# Patient Record
Sex: FEMALE | URBAN - METROPOLITAN AREA
[De-identification: names, ages, dates, MRNs, and addresses within clinical notes are randomized per-mention and may not be internally consistent; named-entity substitution may affect disease eponyms.]

---

## 2024-01-07 ENCOUNTER — INPATIENT (INPATIENT)
Facility: HOSPITAL | Age: 79
LOS: 1 days | Discharge: HOME | DRG: 195 | End: 2024-01-09
Attending: STUDENT IN AN ORGANIZED HEALTH CARE EDUCATION/TRAINING PROGRAM | Admitting: STUDENT IN AN ORGANIZED HEALTH CARE EDUCATION/TRAINING PROGRAM
Payer: MEDICARE

## 2024-01-07 VITALS
DIASTOLIC BLOOD PRESSURE: 69 MMHG | TEMPERATURE: 98 F | RESPIRATION RATE: 17 BRPM | HEIGHT: 63 IN | WEIGHT: 134.04 LBS | HEART RATE: 89 BPM | SYSTOLIC BLOOD PRESSURE: 120 MMHG | OXYGEN SATURATION: 98 %

## 2024-01-07 DIAGNOSIS — J18.9 PNEUMONIA, UNSPECIFIED ORGANISM: ICD-10-CM

## 2024-01-07 LAB
ALBUMIN SERPL ELPH-MCNC: 3 G/DL — LOW (ref 3.5–5)
ALBUMIN SERPL ELPH-MCNC: 3 G/DL — LOW (ref 3.5–5)
ALP SERPL-CCNC: 69 U/L — SIGNIFICANT CHANGE UP (ref 40–120)
ALP SERPL-CCNC: 69 U/L — SIGNIFICANT CHANGE UP (ref 40–120)
ALT FLD-CCNC: 19 U/L DA — SIGNIFICANT CHANGE UP (ref 10–60)
ALT FLD-CCNC: 19 U/L DA — SIGNIFICANT CHANGE UP (ref 10–60)
ANION GAP SERPL CALC-SCNC: 5 MMOL/L — SIGNIFICANT CHANGE UP (ref 5–17)
ANION GAP SERPL CALC-SCNC: 5 MMOL/L — SIGNIFICANT CHANGE UP (ref 5–17)
APTT BLD: 35.6 SEC — SIGNIFICANT CHANGE UP (ref 24.5–35.6)
APTT BLD: 35.6 SEC — SIGNIFICANT CHANGE UP (ref 24.5–35.6)
AST SERPL-CCNC: 23 U/L — SIGNIFICANT CHANGE UP (ref 10–40)
AST SERPL-CCNC: 23 U/L — SIGNIFICANT CHANGE UP (ref 10–40)
BASOPHILS # BLD AUTO: 0.01 K/UL — SIGNIFICANT CHANGE UP (ref 0–0.2)
BASOPHILS # BLD AUTO: 0.01 K/UL — SIGNIFICANT CHANGE UP (ref 0–0.2)
BASOPHILS NFR BLD AUTO: 0.2 % — SIGNIFICANT CHANGE UP (ref 0–2)
BASOPHILS NFR BLD AUTO: 0.2 % — SIGNIFICANT CHANGE UP (ref 0–2)
BILIRUB SERPL-MCNC: 0.3 MG/DL — SIGNIFICANT CHANGE UP (ref 0.2–1.2)
BILIRUB SERPL-MCNC: 0.3 MG/DL — SIGNIFICANT CHANGE UP (ref 0.2–1.2)
BUN SERPL-MCNC: 15 MG/DL — SIGNIFICANT CHANGE UP (ref 7–18)
BUN SERPL-MCNC: 15 MG/DL — SIGNIFICANT CHANGE UP (ref 7–18)
CALCIUM SERPL-MCNC: 9.2 MG/DL — SIGNIFICANT CHANGE UP (ref 8.4–10.5)
CALCIUM SERPL-MCNC: 9.2 MG/DL — SIGNIFICANT CHANGE UP (ref 8.4–10.5)
CHLORIDE SERPL-SCNC: 105 MMOL/L — SIGNIFICANT CHANGE UP (ref 96–108)
CHLORIDE SERPL-SCNC: 105 MMOL/L — SIGNIFICANT CHANGE UP (ref 96–108)
CO2 SERPL-SCNC: 28 MMOL/L — SIGNIFICANT CHANGE UP (ref 22–31)
CO2 SERPL-SCNC: 28 MMOL/L — SIGNIFICANT CHANGE UP (ref 22–31)
CREAT SERPL-MCNC: 1.12 MG/DL — SIGNIFICANT CHANGE UP (ref 0.5–1.3)
CREAT SERPL-MCNC: 1.12 MG/DL — SIGNIFICANT CHANGE UP (ref 0.5–1.3)
EGFR: 51 ML/MIN/1.73M2 — LOW
EGFR: 51 ML/MIN/1.73M2 — LOW
EOSINOPHIL # BLD AUTO: 0.02 K/UL — SIGNIFICANT CHANGE UP (ref 0–0.5)
EOSINOPHIL # BLD AUTO: 0.02 K/UL — SIGNIFICANT CHANGE UP (ref 0–0.5)
EOSINOPHIL NFR BLD AUTO: 0.4 % — SIGNIFICANT CHANGE UP (ref 0–6)
EOSINOPHIL NFR BLD AUTO: 0.4 % — SIGNIFICANT CHANGE UP (ref 0–6)
FLUAV H1 2009 PAND RNA SPEC QL NAA+PROBE: DETECTED
FLUAV H1 2009 PAND RNA SPEC QL NAA+PROBE: DETECTED
GLUCOSE SERPL-MCNC: 96 MG/DL — SIGNIFICANT CHANGE UP (ref 70–99)
GLUCOSE SERPL-MCNC: 96 MG/DL — SIGNIFICANT CHANGE UP (ref 70–99)
HCT VFR BLD CALC: 31.6 % — LOW (ref 34.5–45)
HCT VFR BLD CALC: 31.6 % — LOW (ref 34.5–45)
HGB BLD-MCNC: 10.2 G/DL — LOW (ref 11.5–15.5)
HGB BLD-MCNC: 10.2 G/DL — LOW (ref 11.5–15.5)
IMM GRANULOCYTES NFR BLD AUTO: 0.4 % — SIGNIFICANT CHANGE UP (ref 0–0.9)
IMM GRANULOCYTES NFR BLD AUTO: 0.4 % — SIGNIFICANT CHANGE UP (ref 0–0.9)
INR BLD: 1.14 RATIO — SIGNIFICANT CHANGE UP (ref 0.85–1.18)
INR BLD: 1.14 RATIO — SIGNIFICANT CHANGE UP (ref 0.85–1.18)
LYMPHOCYTES # BLD AUTO: 1.3 K/UL — SIGNIFICANT CHANGE UP (ref 1–3.3)
LYMPHOCYTES # BLD AUTO: 1.3 K/UL — SIGNIFICANT CHANGE UP (ref 1–3.3)
LYMPHOCYTES # BLD AUTO: 24.1 % — SIGNIFICANT CHANGE UP (ref 13–44)
LYMPHOCYTES # BLD AUTO: 24.1 % — SIGNIFICANT CHANGE UP (ref 13–44)
MCHC RBC-ENTMCNC: 30.1 PG — SIGNIFICANT CHANGE UP (ref 27–34)
MCHC RBC-ENTMCNC: 30.1 PG — SIGNIFICANT CHANGE UP (ref 27–34)
MCHC RBC-ENTMCNC: 32.3 GM/DL — SIGNIFICANT CHANGE UP (ref 32–36)
MCHC RBC-ENTMCNC: 32.3 GM/DL — SIGNIFICANT CHANGE UP (ref 32–36)
MCV RBC AUTO: 93.2 FL — SIGNIFICANT CHANGE UP (ref 80–100)
MCV RBC AUTO: 93.2 FL — SIGNIFICANT CHANGE UP (ref 80–100)
MONOCYTES # BLD AUTO: 0.42 K/UL — SIGNIFICANT CHANGE UP (ref 0–0.9)
MONOCYTES # BLD AUTO: 0.42 K/UL — SIGNIFICANT CHANGE UP (ref 0–0.9)
MONOCYTES NFR BLD AUTO: 7.8 % — SIGNIFICANT CHANGE UP (ref 2–14)
MONOCYTES NFR BLD AUTO: 7.8 % — SIGNIFICANT CHANGE UP (ref 2–14)
NEUTROPHILS # BLD AUTO: 3.63 K/UL — SIGNIFICANT CHANGE UP (ref 1.8–7.4)
NEUTROPHILS # BLD AUTO: 3.63 K/UL — SIGNIFICANT CHANGE UP (ref 1.8–7.4)
NEUTROPHILS NFR BLD AUTO: 67.1 % — SIGNIFICANT CHANGE UP (ref 43–77)
NEUTROPHILS NFR BLD AUTO: 67.1 % — SIGNIFICANT CHANGE UP (ref 43–77)
NRBC # BLD: 0 /100 WBCS — SIGNIFICANT CHANGE UP (ref 0–0)
NRBC # BLD: 0 /100 WBCS — SIGNIFICANT CHANGE UP (ref 0–0)
PLATELET # BLD AUTO: 150 K/UL — SIGNIFICANT CHANGE UP (ref 150–400)
PLATELET # BLD AUTO: 150 K/UL — SIGNIFICANT CHANGE UP (ref 150–400)
POTASSIUM SERPL-MCNC: 3.9 MMOL/L — SIGNIFICANT CHANGE UP (ref 3.5–5.3)
POTASSIUM SERPL-MCNC: 3.9 MMOL/L — SIGNIFICANT CHANGE UP (ref 3.5–5.3)
POTASSIUM SERPL-SCNC: 3.9 MMOL/L — SIGNIFICANT CHANGE UP (ref 3.5–5.3)
POTASSIUM SERPL-SCNC: 3.9 MMOL/L — SIGNIFICANT CHANGE UP (ref 3.5–5.3)
PROT SERPL-MCNC: 7.7 G/DL — SIGNIFICANT CHANGE UP (ref 6–8.3)
PROT SERPL-MCNC: 7.7 G/DL — SIGNIFICANT CHANGE UP (ref 6–8.3)
PROTHROM AB SERPL-ACNC: 13 SEC — SIGNIFICANT CHANGE UP (ref 9.5–13)
PROTHROM AB SERPL-ACNC: 13 SEC — SIGNIFICANT CHANGE UP (ref 9.5–13)
RAPID RVP RESULT: DETECTED
RAPID RVP RESULT: DETECTED
RBC # BLD: 3.39 M/UL — LOW (ref 3.8–5.2)
RBC # BLD: 3.39 M/UL — LOW (ref 3.8–5.2)
RBC # FLD: 14.2 % — SIGNIFICANT CHANGE UP (ref 10.3–14.5)
RBC # FLD: 14.2 % — SIGNIFICANT CHANGE UP (ref 10.3–14.5)
SARS-COV-2 RNA SPEC QL NAA+PROBE: SIGNIFICANT CHANGE UP
SARS-COV-2 RNA SPEC QL NAA+PROBE: SIGNIFICANT CHANGE UP
SODIUM SERPL-SCNC: 138 MMOL/L — SIGNIFICANT CHANGE UP (ref 135–145)
SODIUM SERPL-SCNC: 138 MMOL/L — SIGNIFICANT CHANGE UP (ref 135–145)
TROPONIN I, HIGH SENSITIVITY RESULT: 4.1 NG/L — SIGNIFICANT CHANGE UP
TROPONIN I, HIGH SENSITIVITY RESULT: 4.1 NG/L — SIGNIFICANT CHANGE UP
WBC # BLD: 5.4 K/UL — SIGNIFICANT CHANGE UP (ref 3.8–10.5)
WBC # BLD: 5.4 K/UL — SIGNIFICANT CHANGE UP (ref 3.8–10.5)
WBC # FLD AUTO: 5.4 K/UL — SIGNIFICANT CHANGE UP (ref 3.8–10.5)
WBC # FLD AUTO: 5.4 K/UL — SIGNIFICANT CHANGE UP (ref 3.8–10.5)

## 2024-01-07 PROCEDURE — 71045 X-RAY EXAM CHEST 1 VIEW: CPT | Mod: 26

## 2024-01-07 PROCEDURE — 71250 CT THORAX DX C-: CPT | Mod: 26,MA

## 2024-01-07 PROCEDURE — 99285 EMERGENCY DEPT VISIT HI MDM: CPT

## 2024-01-07 PROCEDURE — 93010 ELECTROCARDIOGRAM REPORT: CPT

## 2024-01-07 PROCEDURE — 70498 CT ANGIOGRAPHY NECK: CPT | Mod: 26,MA

## 2024-01-07 PROCEDURE — 70496 CT ANGIOGRAPHY HEAD: CPT | Mod: 26,MA

## 2024-01-07 PROCEDURE — 99222 1ST HOSP IP/OBS MODERATE 55: CPT

## 2024-01-07 PROCEDURE — 70450 CT HEAD/BRAIN W/O DYE: CPT | Mod: 26,MA,XU

## 2024-01-07 RX ORDER — CEFTRIAXONE 500 MG/1
1000 INJECTION, POWDER, FOR SOLUTION INTRAMUSCULAR; INTRAVENOUS ONCE
Refills: 0 | Status: COMPLETED | OUTPATIENT
Start: 2024-01-07 | End: 2024-01-07

## 2024-01-07 RX ORDER — AZITHROMYCIN 500 MG/1
500 TABLET, FILM COATED ORAL ONCE
Refills: 0 | Status: COMPLETED | OUTPATIENT
Start: 2024-01-07 | End: 2024-01-07

## 2024-01-07 RX ADMIN — Medication 75 MILLIGRAM(S): at 23:41

## 2024-01-07 RX ADMIN — CEFTRIAXONE 100 MILLIGRAM(S): 500 INJECTION, POWDER, FOR SOLUTION INTRAMUSCULAR; INTRAVENOUS at 23:42

## 2024-01-07 NOTE — ED ADULT NURSE NOTE - ED STAT RN HANDOFF DETAILS
Report given to RAMON Jones. Pt resting in bed, no acute distress noted, denies chest pain, no SOB noted.

## 2024-01-07 NOTE — ED ADULT NURSE NOTE - OBJECTIVE STATEMENT
Pt sent from urgent care for weakness and slurred speech x couple of days ago. Resolved at this time, pt also c/o cough and congestion on and off x 3months. Pt denies chest pain, no SOB noted. EKG completed.

## 2024-01-07 NOTE — H&P ADULT - PROBLEM SELECTOR PLAN 4
hold metformin, WADE while i/p  titrate to basal bolus as needed hold metformin, WAED while i/p  titrate to basal bolus as needed

## 2024-01-07 NOTE — ED PROVIDER NOTE - CARE PLAN
Principal Discharge DX:	Bilateral pneumonia  Secondary Diagnosis:	Influenza  Secondary Diagnosis:	Brain TIA   1

## 2024-01-07 NOTE — H&P ADULT - HISTORY OF PRESENT ILLNESS
78-year-old female with DM, HLD, GERD, presenting with complaint episode of weakness to the legs and arms with slurred speech that lasted around 20 minutes 2 days ago.  Patient states that her symptoms were present when she woke up and was trying to  a glass of water but could not, felt like her legs were weak.  Symptoms have since resolved. Has been having worsening cough/congestion x 1 week, not responsive to Nyquil/dayquil.  Currently denies any headache dizziness, weakness, numbness or visual changes.  Denies any history of stroke or heart attack. 78-year-old female with DM, HLD, GERD, presenting with complaint episode of weakness to the legs and arms with slurred speech that lasted around 20 minutes 2 days ago.  Patient states that her symptoms were present when she woke up and was trying to  a glass of water but could not, felt like her legs were weak.  Symptoms resolved within minutes. Has been having worsening cough/congestion x 1 week, not responsive to Nyquil/dayquil. She went to Urgent Care for the cough and was sent to ED when she told them about transient weakness.  Currently denies any headache dizziness, weakness, numbness or visual changes.  Denies any history of stroke or heart attack.

## 2024-01-07 NOTE — ED PROVIDER NOTE - OBJECTIVE STATEMENT
78-year-old female history of diabetes on metformin presenting with episode of weakness to the legs and arms with slurred speech that lasted around 20 minutes Friday morning.  Patient states that her symptoms were present when she woke up and was trying to  a glass of water but could not.  Symptoms have since resolved.  Patient has been feeling intermittent dizziness/vertigo since then.  Went to urgent care today who told her that her symptoms could be signs of stroke.  Patient also relates having cough and congestion for 1 week.  Has been taking NyQuil/DayQuil around-the-clock over the same time.  Overall relates having cough since October attributed to "silent reflux" by her ENT, improved after PPI was started.  Currently denies any headache dizziness, weakness, numbness or visual changes.  Denies any history of stroke or heart attack.  Accompanied by

## 2024-01-07 NOTE — H&P ADULT - ASSESSMENT
78-year-old female with DM, HLD, GERD, admitted with pneumonia and influenza. She has no residual neuro deficits, CTH was negative.

## 2024-01-07 NOTE — H&P ADULT - PROBLEM SELECTOR PLAN 3
Complaining of transient leg weakness assoc w speech slurring 3 days ago  CT was negative on this admission  No focal neuro deficits  NIHSS 0  Suspect due to viral prodrome Complaining of transient leg weakness assoc w speech slurring 3 days ago  CT was negative on this admission  No focal neuro deficits  NIHSS 0  Suspect due to viral prodrome  Holding off on neuro consult Complaining of transient leg weakness assoc w speech slurring 3 days ago  CT was negative on this admission  No focal neuro deficits  NIHSS 0  Suspect due to viral prodrome  Holding off on neuro consult  already on statin, can start asa 81 mg in case of TIA for secondary prevention, although suspicion is low for neuro event

## 2024-01-07 NOTE — H&P ADULT - ATTENDING COMMENTS
Vital Signs Last 24 Hrs  T(C): 39.4 (07 Jan 2024 22:35), Max: 39.4 (07 Jan 2024 22:35)  T(F): 102.9 (07 Jan 2024 22:35), Max: 102.9 (07 Jan 2024 22:35)  HR: 84 (07 Jan 2024 22:35) (84 - 89)  BP: 147/65 (07 Jan 2024 22:35) (120/69 - 147/65)  RR: 16 (07 Jan 2024 22:35) (16 - 17)  SpO2: 95% (07 Jan 2024 22:35) (95% - 98%)  Parameters below as of 07 Jan 2024 22:35  Patient On (Oxygen Delivery Method): room air Vital Signs Last 24 Hrs  T(C): 39.4 (07 Jan 2024 22:35), Max: 39.4 (07 Jan 2024 22:35)  T(F): 102.9 (07 Jan 2024 22:35), Max: 102.9 (07 Jan 2024 22:35)  HR: 84 (07 Jan 2024 22:35) (84 - 89)  BP: 147/65 (07 Jan 2024 22:35) (120/69 - 147/65)  RR: 16 (07 Jan 2024 22:35) (16 - 17)  SpO2: 95% (07 Jan 2024 22:35) (95% - 98%)  Parameters below as of 07 Jan 2024 22:35  Patient On (Oxygen Delivery Method): room air    Labs   Flu A - Detected    CT chest - Scattered consolidative opacities and interlobular septal fluid seen at   the bilateral lung bases, left greater than right, concerning for   pneumonia. No pleural effusions.    CT head / CTA head and neck  - unremarkable     Problems    # - Sepsis   # - CAP   # - Flu A infection with URI  # - Generalized weakness  # - DM on OHA  # - Recent hx of suspected TIA     Plan   Admit to Medicine with respiratory isolation   Sepsis work up   Empiric CAP antibiotics  Supportive care   Insulin therapy   No evidence of stroke on imaging; secondary prevention

## 2024-01-07 NOTE — ED PROVIDER NOTE - CLINICAL SUMMARY MEDICAL DECISION MAKING FREE TEXT BOX
78-year-old female presenting after having episode of weakness and slurred speech on Friday.  Also relates having dizziness.  Currently with normal neuroexam with no acute complaints.  Symptoms may suggest TIA, metabolic derangement, medication side effect among other causes.  Plan to perform CT/CTA of head and neck, labs, EKG, chest x-ray, viral swab and reassess.

## 2024-01-07 NOTE — H&P ADULT - PROBLEM SELECTOR PLAN 1
WBC WNL  T >102F  CXR showing b/l PNA  RVP +influenza  Cannot rule out superimposed phyllis pna on viral pna  s/p ctx, azithro, tamiflu in ED  Not meeting sepsis criteria    -f/u strep pneumo, legionella, mycoplasma  -f/u cx  -cont CTX/azithro  -cont tamiflu  -supportive care w antitussives and fever control WBC WNL  T >102F  CXR showing b/l PNA  CT- susp for PNA   RVP +influenza  Cannot rule out superimposed phyllis pna on viral pna  s/p ctx, azithro, tamiflu in ED  Not meeting sepsis criteria    -f/u strep pneumo, legionella, mycoplasma  -f/u cx  -cont CTX/azithro  -cont tamiflu  -supportive care w antitussives and fever control

## 2024-01-07 NOTE — H&P ADULT - NSICDXPASTMEDICALHX_GEN_ALL_CORE_FT
PAST MEDICAL HISTORY:  Diabetes     GERD (gastroesophageal reflux disease)     HLD (hyperlipidemia)     Sleep apnea syndrome

## 2024-01-07 NOTE — ED ADULT TRIAGE NOTE - HEIGHT IN INCHES
Postpartum # 1    Pain is well controlled on oral medication. Bleeding is mild.  She is ambulating well,  tolerating a general diet, and is voiding spontaneously. She also complains of nothing and specifically denies symptoms related to PP anemia.    Visit Vitals  BP 98/66 (BP Location: RUE - Right upper extremity, Patient Position: Sitting)   Pulse 86   Temp 98.2 °F (36.8 °C) (Oral)   Resp 16   Ht 5' 2\" (1.575 m)   Wt 87.5 kg   LMP 08/19/2022   SpO2 98%   Breastfeeding No   BMI 35.30 kg/m²     Uterine fundus firm, non-tender.  Extremities: Non-tender, trace edema  HCT (%)   Date Value   07/18/2023 23.0 (L)     No results found    Invalid input(s): \"ALKPHOS\"       Impression: Doing well.  PP anemia - esslly asymptomatic.    Plan: Routine postpartum care.  Desires DC today.   3

## 2024-01-07 NOTE — ED ADULT NURSE NOTE - NSFALLUNIVINTERV_ED_ALL_ED
Bed/Stretcher in lowest position, wheels locked, appropriate side rails in place/Call bell, personal items and telephone in reach/Instruct patient to call for assistance before getting out of bed/chair/stretcher/Non-slip footwear applied when patient is off stretcher/[place] to call system/Physically safe environment - no spills, clutter or unnecessary equipment/Purposeful proactive rounding/Room/bathroom lighting operational, light cord in reach Bed/Stretcher in lowest position, wheels locked, appropriate side rails in place/Call bell, personal items and telephone in reach/Instruct patient to call for assistance before getting out of bed/chair/stretcher/Non-slip footwear applied when patient is off stretcher/[place] to call system/Physically safe environment - no spills, clutter or unnecessary equipment/Purposeful proactive rounding/Room/bathroom lighting operational, light cord in reach

## 2024-01-07 NOTE — ED ADULT TRIAGE NOTE - CHIEF COMPLAINT QUOTE
Pt sent from Urgent Care c/o weakness and loss of balance x 2 days ago, cough and congestion x 3 months.

## 2024-01-08 ENCOUNTER — TRANSCRIPTION ENCOUNTER (OUTPATIENT)
Age: 79
End: 2024-01-08

## 2024-01-08 DIAGNOSIS — J18.9 PNEUMONIA, UNSPECIFIED ORGANISM: ICD-10-CM

## 2024-01-08 DIAGNOSIS — Z29.9 ENCOUNTER FOR PROPHYLACTIC MEASURES, UNSPECIFIED: ICD-10-CM

## 2024-01-08 DIAGNOSIS — Z02.9 ENCOUNTER FOR ADMINISTRATIVE EXAMINATIONS, UNSPECIFIED: ICD-10-CM

## 2024-01-08 DIAGNOSIS — J10.1 INFLUENZA DUE TO OTHER IDENTIFIED INFLUENZA VIRUS WITH OTHER RESPIRATORY MANIFESTATIONS: ICD-10-CM

## 2024-01-08 DIAGNOSIS — R29.898 OTHER SYMPTOMS AND SIGNS INVOLVING THE MUSCULOSKELETAL SYSTEM: ICD-10-CM

## 2024-01-08 DIAGNOSIS — G47.33 OBSTRUCTIVE SLEEP APNEA (ADULT) (PEDIATRIC): ICD-10-CM

## 2024-01-08 DIAGNOSIS — E11.9 TYPE 2 DIABETES MELLITUS WITHOUT COMPLICATIONS: ICD-10-CM

## 2024-01-08 DIAGNOSIS — K21.9 GASTRO-ESOPHAGEAL REFLUX DISEASE WITHOUT ESOPHAGITIS: ICD-10-CM

## 2024-01-08 DIAGNOSIS — E78.5 HYPERLIPIDEMIA, UNSPECIFIED: ICD-10-CM

## 2024-01-08 LAB
A1C WITH ESTIMATED AVERAGE GLUCOSE RESULT: 5.7 % — HIGH (ref 4–5.6)
A1C WITH ESTIMATED AVERAGE GLUCOSE RESULT: 5.7 % — HIGH (ref 4–5.6)
ANION GAP SERPL CALC-SCNC: 5 MMOL/L — SIGNIFICANT CHANGE UP (ref 5–17)
ANION GAP SERPL CALC-SCNC: 5 MMOL/L — SIGNIFICANT CHANGE UP (ref 5–17)
APPEARANCE UR: CLEAR — SIGNIFICANT CHANGE UP
APPEARANCE UR: CLEAR — SIGNIFICANT CHANGE UP
BACTERIA # UR AUTO: ABNORMAL /HPF
BACTERIA # UR AUTO: ABNORMAL /HPF
BILIRUB UR-MCNC: NEGATIVE — SIGNIFICANT CHANGE UP
BILIRUB UR-MCNC: NEGATIVE — SIGNIFICANT CHANGE UP
BUN SERPL-MCNC: 12 MG/DL — SIGNIFICANT CHANGE UP (ref 7–18)
BUN SERPL-MCNC: 12 MG/DL — SIGNIFICANT CHANGE UP (ref 7–18)
CALCIUM SERPL-MCNC: 8.7 MG/DL — SIGNIFICANT CHANGE UP (ref 8.4–10.5)
CALCIUM SERPL-MCNC: 8.7 MG/DL — SIGNIFICANT CHANGE UP (ref 8.4–10.5)
CHLORIDE SERPL-SCNC: 103 MMOL/L — SIGNIFICANT CHANGE UP (ref 96–108)
CHLORIDE SERPL-SCNC: 103 MMOL/L — SIGNIFICANT CHANGE UP (ref 96–108)
CO2 SERPL-SCNC: 27 MMOL/L — SIGNIFICANT CHANGE UP (ref 22–31)
CO2 SERPL-SCNC: 27 MMOL/L — SIGNIFICANT CHANGE UP (ref 22–31)
COLOR SPEC: YELLOW — SIGNIFICANT CHANGE UP
COLOR SPEC: YELLOW — SIGNIFICANT CHANGE UP
CREAT SERPL-MCNC: 1.17 MG/DL — SIGNIFICANT CHANGE UP (ref 0.5–1.3)
CREAT SERPL-MCNC: 1.17 MG/DL — SIGNIFICANT CHANGE UP (ref 0.5–1.3)
DIFF PNL FLD: NEGATIVE — SIGNIFICANT CHANGE UP
DIFF PNL FLD: NEGATIVE — SIGNIFICANT CHANGE UP
EGFR: 48 ML/MIN/1.73M2 — LOW
EGFR: 48 ML/MIN/1.73M2 — LOW
EPI CELLS # UR: PRESENT
EPI CELLS # UR: PRESENT
ESTIMATED AVERAGE GLUCOSE: 117 MG/DL — HIGH (ref 68–114)
ESTIMATED AVERAGE GLUCOSE: 117 MG/DL — HIGH (ref 68–114)
GLUCOSE BLDC GLUCOMTR-MCNC: 143 MG/DL — HIGH (ref 70–99)
GLUCOSE BLDC GLUCOMTR-MCNC: 143 MG/DL — HIGH (ref 70–99)
GLUCOSE BLDC GLUCOMTR-MCNC: 92 MG/DL — SIGNIFICANT CHANGE UP (ref 70–99)
GLUCOSE BLDC GLUCOMTR-MCNC: 92 MG/DL — SIGNIFICANT CHANGE UP (ref 70–99)
GLUCOSE BLDC GLUCOMTR-MCNC: 99 MG/DL — SIGNIFICANT CHANGE UP (ref 70–99)
GLUCOSE BLDC GLUCOMTR-MCNC: 99 MG/DL — SIGNIFICANT CHANGE UP (ref 70–99)
GLUCOSE SERPL-MCNC: 112 MG/DL — HIGH (ref 70–99)
GLUCOSE SERPL-MCNC: 112 MG/DL — HIGH (ref 70–99)
GLUCOSE UR QL: NEGATIVE MG/DL — SIGNIFICANT CHANGE UP
GLUCOSE UR QL: NEGATIVE MG/DL — SIGNIFICANT CHANGE UP
HCT VFR BLD CALC: 30.1 % — LOW (ref 34.5–45)
HCT VFR BLD CALC: 30.1 % — LOW (ref 34.5–45)
HCV AB S/CO SERPL IA: 0.17 S/CO — SIGNIFICANT CHANGE UP (ref 0–0.99)
HCV AB S/CO SERPL IA: 0.17 S/CO — SIGNIFICANT CHANGE UP (ref 0–0.99)
HCV AB SERPL-IMP: SIGNIFICANT CHANGE UP
HCV AB SERPL-IMP: SIGNIFICANT CHANGE UP
HGB BLD-MCNC: 9.7 G/DL — LOW (ref 11.5–15.5)
HGB BLD-MCNC: 9.7 G/DL — LOW (ref 11.5–15.5)
KETONES UR-MCNC: NEGATIVE MG/DL — SIGNIFICANT CHANGE UP
KETONES UR-MCNC: NEGATIVE MG/DL — SIGNIFICANT CHANGE UP
LEUKOCYTE ESTERASE UR-ACNC: NEGATIVE — SIGNIFICANT CHANGE UP
LEUKOCYTE ESTERASE UR-ACNC: NEGATIVE — SIGNIFICANT CHANGE UP
MAGNESIUM SERPL-MCNC: 1.8 MG/DL — SIGNIFICANT CHANGE UP (ref 1.6–2.6)
MAGNESIUM SERPL-MCNC: 1.8 MG/DL — SIGNIFICANT CHANGE UP (ref 1.6–2.6)
MCHC RBC-ENTMCNC: 29.8 PG — SIGNIFICANT CHANGE UP (ref 27–34)
MCHC RBC-ENTMCNC: 29.8 PG — SIGNIFICANT CHANGE UP (ref 27–34)
MCHC RBC-ENTMCNC: 32.2 GM/DL — SIGNIFICANT CHANGE UP (ref 32–36)
MCHC RBC-ENTMCNC: 32.2 GM/DL — SIGNIFICANT CHANGE UP (ref 32–36)
MCV RBC AUTO: 92.3 FL — SIGNIFICANT CHANGE UP (ref 80–100)
MCV RBC AUTO: 92.3 FL — SIGNIFICANT CHANGE UP (ref 80–100)
NITRITE UR-MCNC: NEGATIVE — SIGNIFICANT CHANGE UP
NITRITE UR-MCNC: NEGATIVE — SIGNIFICANT CHANGE UP
NRBC # BLD: 0 /100 WBCS — SIGNIFICANT CHANGE UP (ref 0–0)
NRBC # BLD: 0 /100 WBCS — SIGNIFICANT CHANGE UP (ref 0–0)
PH UR: 5.5 — SIGNIFICANT CHANGE UP (ref 5–8)
PH UR: 5.5 — SIGNIFICANT CHANGE UP (ref 5–8)
PHOSPHATE SERPL-MCNC: 2.5 MG/DL — SIGNIFICANT CHANGE UP (ref 2.5–4.5)
PHOSPHATE SERPL-MCNC: 2.5 MG/DL — SIGNIFICANT CHANGE UP (ref 2.5–4.5)
PLATELET # BLD AUTO: 143 K/UL — LOW (ref 150–400)
PLATELET # BLD AUTO: 143 K/UL — LOW (ref 150–400)
POTASSIUM SERPL-MCNC: 3.6 MMOL/L — SIGNIFICANT CHANGE UP (ref 3.5–5.3)
POTASSIUM SERPL-MCNC: 3.6 MMOL/L — SIGNIFICANT CHANGE UP (ref 3.5–5.3)
POTASSIUM SERPL-SCNC: 3.6 MMOL/L — SIGNIFICANT CHANGE UP (ref 3.5–5.3)
POTASSIUM SERPL-SCNC: 3.6 MMOL/L — SIGNIFICANT CHANGE UP (ref 3.5–5.3)
PROT UR-MCNC: 30 MG/DL
PROT UR-MCNC: 30 MG/DL
RBC # BLD: 3.26 M/UL — LOW (ref 3.8–5.2)
RBC # BLD: 3.26 M/UL — LOW (ref 3.8–5.2)
RBC # FLD: 14.1 % — SIGNIFICANT CHANGE UP (ref 10.3–14.5)
RBC # FLD: 14.1 % — SIGNIFICANT CHANGE UP (ref 10.3–14.5)
RBC CASTS # UR COMP ASSIST: 0 /HPF — SIGNIFICANT CHANGE UP (ref 0–4)
RBC CASTS # UR COMP ASSIST: 0 /HPF — SIGNIFICANT CHANGE UP (ref 0–4)
S PNEUM AG UR QL: NEGATIVE — SIGNIFICANT CHANGE UP
S PNEUM AG UR QL: NEGATIVE — SIGNIFICANT CHANGE UP
SODIUM SERPL-SCNC: 135 MMOL/L — SIGNIFICANT CHANGE UP (ref 135–145)
SODIUM SERPL-SCNC: 135 MMOL/L — SIGNIFICANT CHANGE UP (ref 135–145)
SP GR SPEC: 1.08 — HIGH (ref 1–1.03)
SP GR SPEC: 1.08 — HIGH (ref 1–1.03)
UROBILINOGEN FLD QL: 1 MG/DL — SIGNIFICANT CHANGE UP (ref 0.2–1)
UROBILINOGEN FLD QL: 1 MG/DL — SIGNIFICANT CHANGE UP (ref 0.2–1)
WBC # BLD: 5.14 K/UL — SIGNIFICANT CHANGE UP (ref 3.8–10.5)
WBC # BLD: 5.14 K/UL — SIGNIFICANT CHANGE UP (ref 3.8–10.5)
WBC # FLD AUTO: 5.14 K/UL — SIGNIFICANT CHANGE UP (ref 3.8–10.5)
WBC # FLD AUTO: 5.14 K/UL — SIGNIFICANT CHANGE UP (ref 3.8–10.5)
WBC UR QL: 0 /HPF — SIGNIFICANT CHANGE UP (ref 0–5)
WBC UR QL: 0 /HPF — SIGNIFICANT CHANGE UP (ref 0–5)

## 2024-01-08 PROCEDURE — 99233 SBSQ HOSP IP/OBS HIGH 50: CPT

## 2024-01-08 RX ORDER — CEFTRIAXONE 500 MG/1
1000 INJECTION, POWDER, FOR SOLUTION INTRAMUSCULAR; INTRAVENOUS EVERY 24 HOURS
Refills: 0 | Status: DISCONTINUED | OUTPATIENT
Start: 2024-01-08 | End: 2024-01-09

## 2024-01-08 RX ORDER — INSULIN LISPRO 100/ML
VIAL (ML) SUBCUTANEOUS
Refills: 0 | Status: DISCONTINUED | OUTPATIENT
Start: 2024-01-08 | End: 2024-01-09

## 2024-01-08 RX ORDER — ASPIRIN/CALCIUM CARB/MAGNESIUM 324 MG
81 TABLET ORAL DAILY
Refills: 0 | Status: DISCONTINUED | OUTPATIENT
Start: 2024-01-08 | End: 2024-01-09

## 2024-01-08 RX ORDER — ONDANSETRON 8 MG/1
4 TABLET, FILM COATED ORAL EVERY 8 HOURS
Refills: 0 | Status: DISCONTINUED | OUTPATIENT
Start: 2024-01-08 | End: 2024-01-09

## 2024-01-08 RX ORDER — ENOXAPARIN SODIUM 100 MG/ML
40 INJECTION SUBCUTANEOUS EVERY 24 HOURS
Refills: 0 | Status: DISCONTINUED | OUTPATIENT
Start: 2024-01-08 | End: 2024-01-09

## 2024-01-08 RX ORDER — INSULIN LISPRO 100/ML
VIAL (ML) SUBCUTANEOUS AT BEDTIME
Refills: 0 | Status: DISCONTINUED | OUTPATIENT
Start: 2024-01-08 | End: 2024-01-09

## 2024-01-08 RX ORDER — FAMOTIDINE 10 MG/ML
20 INJECTION INTRAVENOUS DAILY
Refills: 0 | Status: DISCONTINUED | OUTPATIENT
Start: 2024-01-08 | End: 2024-01-09

## 2024-01-08 RX ORDER — SODIUM CHLORIDE 9 MG/ML
1000 INJECTION INTRAMUSCULAR; INTRAVENOUS; SUBCUTANEOUS
Refills: 0 | Status: DISCONTINUED | OUTPATIENT
Start: 2024-01-08 | End: 2024-01-09

## 2024-01-08 RX ORDER — OXYMETAZOLINE HYDROCHLORIDE 0.5 MG/ML
2 SPRAY NASAL EVERY 12 HOURS
Refills: 0 | Status: DISCONTINUED | OUTPATIENT
Start: 2024-01-08 | End: 2024-01-09

## 2024-01-08 RX ORDER — ACETAMINOPHEN 500 MG
650 TABLET ORAL EVERY 6 HOURS
Refills: 0 | Status: DISCONTINUED | OUTPATIENT
Start: 2024-01-08 | End: 2024-01-09

## 2024-01-08 RX ORDER — ATORVASTATIN CALCIUM 80 MG/1
10 TABLET, FILM COATED ORAL AT BEDTIME
Refills: 0 | Status: DISCONTINUED | OUTPATIENT
Start: 2024-01-08 | End: 2024-01-09

## 2024-01-08 RX ORDER — SODIUM CHLORIDE 9 MG/ML
1000 INJECTION INTRAMUSCULAR; INTRAVENOUS; SUBCUTANEOUS
Refills: 0 | Status: DISCONTINUED | OUTPATIENT
Start: 2024-01-08 | End: 2024-01-08

## 2024-01-08 RX ORDER — PANTOPRAZOLE SODIUM 20 MG/1
40 TABLET, DELAYED RELEASE ORAL
Refills: 0 | Status: DISCONTINUED | OUTPATIENT
Start: 2024-01-08 | End: 2024-01-09

## 2024-01-08 RX ORDER — LANOLIN ALCOHOL/MO/W.PET/CERES
3 CREAM (GRAM) TOPICAL AT BEDTIME
Refills: 0 | Status: DISCONTINUED | OUTPATIENT
Start: 2024-01-08 | End: 2024-01-09

## 2024-01-08 RX ORDER — AZITHROMYCIN 500 MG/1
500 TABLET, FILM COATED ORAL EVERY 24 HOURS
Refills: 0 | Status: DISCONTINUED | OUTPATIENT
Start: 2024-01-08 | End: 2024-01-09

## 2024-01-08 RX ADMIN — OXYMETAZOLINE HYDROCHLORIDE 2 SPRAY(S): 0.5 SPRAY NASAL at 18:19

## 2024-01-08 RX ADMIN — ENOXAPARIN SODIUM 40 MILLIGRAM(S): 100 INJECTION SUBCUTANEOUS at 06:34

## 2024-01-08 RX ADMIN — Medication 81 MILLIGRAM(S): at 12:39

## 2024-01-08 RX ADMIN — ATORVASTATIN CALCIUM 10 MILLIGRAM(S): 80 TABLET, FILM COATED ORAL at 22:13

## 2024-01-08 RX ADMIN — Medication 3 MILLIGRAM(S): at 22:12

## 2024-01-08 RX ADMIN — Medication 30 MILLIGRAM(S): at 18:18

## 2024-01-08 RX ADMIN — SODIUM CHLORIDE 100 MILLILITER(S): 9 INJECTION INTRAMUSCULAR; INTRAVENOUS; SUBCUTANEOUS at 06:33

## 2024-01-08 RX ADMIN — CEFTRIAXONE 100 MILLIGRAM(S): 500 INJECTION, POWDER, FOR SOLUTION INTRAMUSCULAR; INTRAVENOUS at 23:17

## 2024-01-08 RX ADMIN — Medication 200 MILLIGRAM(S): at 15:30

## 2024-01-08 RX ADMIN — Medication 200 MILLIGRAM(S): at 22:12

## 2024-01-08 RX ADMIN — Medication 30 MILLIGRAM(S): at 06:34

## 2024-01-08 RX ADMIN — PANTOPRAZOLE SODIUM 40 MILLIGRAM(S): 20 TABLET, DELAYED RELEASE ORAL at 08:04

## 2024-01-08 RX ADMIN — AZITHROMYCIN 255 MILLIGRAM(S): 500 TABLET, FILM COATED ORAL at 02:21

## 2024-01-08 RX ADMIN — OXYMETAZOLINE HYDROCHLORIDE 2 SPRAY(S): 0.5 SPRAY NASAL at 06:33

## 2024-01-08 RX ADMIN — FAMOTIDINE 20 MILLIGRAM(S): 10 INJECTION INTRAVENOUS at 12:39

## 2024-01-08 NOTE — PROGRESS NOTE ADULT - NS ATTEND AMEND GEN_ALL_CORE FT
I have seen and evaluated the patient at the bedside. She says that she is feeling better compared to when she presented. Her breathing is better but she is still having "severe coughing fits." Her cough is nonproductive. She is asking for a cough suppressant, is ok with Mucinex after discussion. No chest pain. No abdominal pain, vomiting or diarrhea.     Regarding her leg weakness, she was a bit dismissive of the severity. Says it was "short lived" and "affected both legs equally." No longer having any weakness. No numbness of lower extremities either.     On exam, patient is laying supine in bed in no acute distress. She is now afebrile (cam in with temp > 38 C), HR in 80s and /59 in the AM. Her cardiopulmonary exam is unremarkable with exception of crackles in the left mid lung zone. On Neuro exam, she has 5/5 motor strength with hip flexion, knee extension/flexion and ankle dorsiflexion/plantarflexion bilaterally. Sensation intact to light touch of bilateral LE.     I have reviewed her CBC, BMP, Mag, phos. No leukocytosis, WBC 5. Renal function normal. Mag/phos ok    I have personally reviewed CT chest with lung windows. Her parenchyma is quite clear for the most part. There are two small areas of increased density in left mid lung zone.     Radiology read of CXR -  left retrocardiac opacity.     Assessment and Plan:        Problems    #  Flu A infection with URI c/b  #  Superimposed Bacterial Pneumonia of the left lung  #  Generalized weakness, resolved  #  DM   #  Recent hx of suspected TIA       - continue Empiric CAP antibiotics with ceftriaxone and azithromycin, with planned 5 day course  -Supportive care for influenza + Tamiflu; will add Mucinex  -obtain lipid profile for TIA risk factor assessment; patient only on atorvastatin 10 mg and should have intensity increase  -No evidence of stroke on imaging; secondary prevention. History not suggestive of TIA given bilateral LE weakness   -A1c 5.7%, well controlled  -Dispo: likely home on 1/9 if continues to clinically improve, remains on room air, could switch to oral cefpodoxime to complete the course    -rest of plan per NP note I have seen and evaluated the patient at the bedside. She says that she is feeling better compared to when she presented. Her breathing is better but she is still having "severe coughing fits." Her cough is nonproductive. She is asking for a cough suppressant, is ok with Mucinex after discussion. No chest pain. No abdominal pain, vomiting or diarrhea.     Regarding her leg weakness, she was a bit dismissive of the severity. Says it was "short lived" and "affected both legs equally." No longer having any weakness. No numbness of lower extremities either.     On exam, patient is laying supine in bed in no acute distress. She is now afebrile (cam in with temp > 38 C), HR in 80s and /59 in the AM. Her cardiopulmonary exam is unremarkable with exception of crackles in the left mid lung zone. On Neuro exam, she has 5/5 motor strength with hip flexion, knee extension/flexion and ankle dorsiflexion/plantarflexion bilaterally. Sensation intact to light touch of bilateral LE.     I have reviewed her CBC, BMP, Mag, phos. No leukocytosis, WBC 5. Renal function normal. Mag/phos ok    I have personally reviewed CT chest with lung windows. Her parenchyma is quite clear for the most part. There are two small areas of increased density in left mid lung zone.     Radiology read of CXR -  left retrocardiac opacity.     Assessment and Plan:    78-year-old female with DM, HLD, GERD, admitted with pneumonia and influenza. She has no residual neuro deficits, CTH was negative. Given the bilateral LE weakness, not highly suggestive of TIA, but will proceed with A1c%, lipid profile, continue aspirin/statin therapy.     Problems    #  Flu A infection with URI c/b  #  Superimposed Bacterial Pneumonia of the left lung  #  Generalized weakness, resolved  #  DM   #  Recent hx of suspected TIA       - continue Empiric CAP antibiotics with ceftriaxone and azithromycin, with planned 5 day course  -Supportive care for influenza + Tamiflu; will add Mucinex  -obtain lipid profile for TIA risk factor assessment; patient only on atorvastatin 10 mg and should have intensity increase  -No evidence of stroke on imaging; secondary prevention. History not suggestive of TIA given bilateral LE weakness   -A1c 5.7%, well controlled  -Dispo: likely home on 1/9 if continues to clinically improve, remains on room air, could switch to oral cefpodoxime to complete the course        -rest of plan per NP note

## 2024-01-08 NOTE — PROGRESS NOTE ADULT - ASSESSMENT
78-year-old female with DM, HLD, GERD, admitted with pneumonia and influenza. She has no residual neuro deficits, CTH was negative.  1/8: patient verbalizing feeling overall better but continues to have cough, will continue with antibiotics and tamiflu, and mucinex added for cough suppression, likely d/c in am on oral abx if improved.

## 2024-01-08 NOTE — DISCHARGE NOTE PROVIDER - HOSPITAL COURSE
78-year-old female with DM, HLD, GERD, admitted with pneumonia and influenza. She has no residual neuro deficits, CTH was negative. Given the bilateral LE weakness, not highly suggestive of TIA, but will proceed with A1c%, lipid profile, continue aspirin/statin therapy.     Attending Attestation:    Floor Course by Problem:    #Influenza URI  #Bacterial Pneumonia  The patient tested positive for influenza and was placed on droplet isolation and started on Tamiflu. She underwent chest imaging in the ED, both a CT chest and a CXR, concerning for a left retrocardiac opacity. As a result, she was also treated with ceftriaxone and azithromycin for a community acquired pneumonia. She experienced clinical improvement and remained on room air and was ready for DC Home on 1/9***. She will go home and finish a 5 day total course of antibiotics (3 additional days of cefpodoxime and 1 additional day of azithromycin). She will also complete a 5 day course of Tamiflu. She was counseled to return for any new or worsening symptoms.     #Transient Bilateral Left LE Weakness  Patient reported transient (approximately 20 minutes) of left lower extremity weakness. Given the transient neurologic deficit, certainly raised concern about TIA but the bilateral nature of the findings pointed against this. She underwent head CT which was negative. A1c was 5.7%. Lipid profile demonstrated ***. Patient was already on aspirin and statin but with room to increase her statin dose.   78-year-old female with DM, HLD, GERD, admitted with pneumonia and influenza. She has no residual neuro deficits, CTH was negative. Given the bilateral LE weakness, not highly suggestive of TIA, but will proceed with A1c%, lipid profile, continue aspirin/statin therapy.     Medically optimized for discharge home  Note this is just a brief course for full course please refer to daily progress and consult notes.    Attending Attestation:    Floor Course by Problem:    #Influenza URI  #Bacterial Pneumonia  The patient tested positive for influenza and was placed on droplet isolation and started on Tamiflu. She underwent chest imaging in the ED, both a CT chest and a CXR, concerning for a left retrocardiac opacity. As a result, she was also treated with ceftriaxone and azithromycin for a community acquired pneumonia. She experienced clinical improvement and remained on room air. She will go home and finish a 5 day total course of antibiotics (3 additional days of cefpodoxime and 1 additional day of azithromycin). She will also complete a 5 day course of Tamiflu. She was counseled to return for any new or worsening symptoms.     #Transient Bilateral Left LE Weakness  Patient reported transient (approximately 20 minutes) of left lower extremity weakness. Given the transient neurologic deficit, certainly raised concern about TIA but the bilateral nature of the findings pointed against this. She underwent head CT which was negative. A1c was 5.7%. Lipid profile demonstrated . Patient was already on aspirin and statin but with room to increase her statin dose.   78-year-old female with DM, HLD, GERD, admitted with pneumonia and influenza. She has no residual neuro deficits, CTH was negative. Given the bilateral LE weakness, not highly suggestive of TIA, but will proceed with A1c%, lipid profile, continue aspirin/statin therapy.     Medically optimized for discharge home  Note this is just a brief course for full course please refer to daily progress and consult notes.    Attending Attestation:    Floor Course by Problem:    #Influenza URI  #Bacterial Pneumonia  The patient tested positive for influenza and was placed on droplet isolation and started on Tamiflu. She underwent chest imaging in the ED, both a CT chest and a CXR, concerning for a left retrocardiac opacity. As a result, she was also treated with ceftriaxone and azithromycin for a community acquired pneumonia. She experienced clinical improvement and remained on room air. She will go home and finish a 5 day total course of antibiotics (3 additional days of cefpodoxime and 1 additional day of azithromycin). She will also complete a 5 day course of Tamiflu. She was counseled to return for any new or worsening symptoms.     #Transient Bilateral Left LE Weakness  Patient reported transient (approximately 20 minutes) of left lower extremity weakness. Given the transient neurologic deficit, certainly raised concern about TIA but the bilateral nature of the findings pointed against this. She underwent head CT which was negative. A1c was 5.7%. Lipid profile demonstrated . Patient was already on aspirin and statin, continue current regimen.

## 2024-01-08 NOTE — PATIENT PROFILE ADULT - FALL HARM RISK - UNIVERSAL INTERVENTIONS
Bed in lowest position, wheels locked, appropriate side rails in place/Call bell, personal items and telephone in reach/Instruct patient to call for assistance before getting out of bed or chair/Non-slip footwear when patient is out of bed/[place] to call system/Physically safe environment - no spills, clutter or unnecessary equipment/Purposeful Proactive Rounding/Room/bathroom lighting operational, light cord in reach Bed in lowest position, wheels locked, appropriate side rails in place/Call bell, personal items and telephone in reach/Instruct patient to call for assistance before getting out of bed or chair/Non-slip footwear when patient is out of bed/[place] to call system/Physically safe environment - no spills, clutter or unnecessary equipment/Purposeful Proactive Rounding/Room/bathroom lighting operational, light cord in reach

## 2024-01-08 NOTE — DISCHARGE NOTE PROVIDER - NSDCCPCAREPLAN_GEN_ALL_CORE_FT
PRINCIPAL DISCHARGE DIAGNOSIS  Diagnosis: Influenza  Assessment and Plan of Treatment: You were found to have a respiratory infection seconadry to influenza. The flu is very common in the winter season. Please continue taking all of your Tamiflu as directed to help hasten the resolutoin of your symptoms. Please rest at home and drink plenty of fluids. Isolate yourself from others for the next 3-4 days to try and prevent the spread of the virus. Wear a mask around others when possible.      SECONDARY DISCHARGE DIAGNOSES  Diagnosis: Community acquired pneumonia  Assessment and Plan of Treatment: Upon review of your chest XR and chest CT, there was a region of denity in the left middle portion of your lung that raised concern for a bacterial pneumonia as well. You were started on antibiotics (ceftriaxone and azithromycin) to help combat pneumonia. Please finish all of your oral antibiotics at home as directed).

## 2024-01-08 NOTE — DISCHARGE NOTE PROVIDER - NSDCMRMEDTOKEN_GEN_ALL_CORE_FT
famotidine 20 mg oral tablet: 1 tab(s) orally once a day  Lipitor 10 mg oral tablet: 1 tab(s) orally once a day  metFORMIN 750 mg oral tablet, extended release: 1 tab(s) orally once a day  omeprazole 20 mg oral delayed release tablet: 1 tab(s) orally once a day  Sinex 12 Hour 0.05% nasal spray: 2 spray(s) in each nostril 2 times a day   atorvastatin 10 mg oral tablet: 1 tab(s) orally once a day (at bedtime)  famotidine 20 mg oral tablet: 1 tab(s) orally once a day  metFORMIN 750 mg oral tablet, extended release: 1 tab(s) orally once a day  omeprazole 20 mg oral delayed release tablet: 1 tab(s) orally once a day  Sinex 12 Hour 0.05% nasal spray: 2 spray(s) in each nostril 2 times a day

## 2024-01-08 NOTE — PROGRESS NOTE ADULT - PROBLEM SELECTOR PLAN 3
Complaining of transient leg weakness assoc w speech slurring 3 days ago, resolved PTA   CT was negative on this admission  No focal neuro deficits  NIHSS 0  Suspect due to viral prodrome  Holding off on neuro consult  f/u lipid profile   already on statin, can start asa 81 mg in case of TIA for secondary prevention, although suspicion is low for neuro event

## 2024-01-08 NOTE — DISCHARGE NOTE PROVIDER - CARE PROVIDER_API CALL
PIYUSH HORNER  [address], [address]  [place], [place] [id number]  Phone: [phone]  Fax: [phone]  Follow Up Time: 1 week   PIYUSH HORNER  [address], [address]  [place], [place] [id number]  Phone: [phone]  Fax: [phone]  Follow Up Time: 1 week

## 2024-01-08 NOTE — PROGRESS NOTE ADULT - PROBLEM SELECTOR PLAN 1
WBC WNL  T >102F  CXR showing b/l PNA  CT- susp for PNA   RVP +influenza  Cannot rule out superimposed phyllis pna on viral pna  s/p ctx, azithro, tamiflu in ED  Not meeting sepsis criteria  -f/u strep pneumo, legionella, mycoplasma  -f/u cx  -cont CTX/azithro  -cont tamiflu  -supportive care w antitussives and fever control

## 2024-01-08 NOTE — PROGRESS NOTE ADULT - SUBJECTIVE AND OBJECTIVE BOX
NP Note discussed with  Primary Attending    Patient is a 78y old  Female who presents with a chief complaint of influenza PNA (07 Jan 2024 22:57)      INTERVAL HPI/OVERNIGHT EVENTS: no new complaints    MEDICATIONS  (STANDING):  aspirin  chewable 81 milliGRAM(s) Oral daily  atorvastatin 10 milliGRAM(s) Oral at bedtime  azithromycin  IVPB 500 milliGRAM(s) IV Intermittent every 24 hours  cefTRIAXone   IVPB 1000 milliGRAM(s) IV Intermittent every 24 hours  enoxaparin Injectable 40 milliGRAM(s) SubCutaneous every 24 hours  famotidine    Tablet 20 milliGRAM(s) Oral daily  insulin lispro (ADMELOG) corrective regimen sliding scale   SubCutaneous three times a day before meals  insulin lispro (ADMELOG) corrective regimen sliding scale   SubCutaneous at bedtime  oseltamivir 30 milliGRAM(s) Oral two times a day  oxymetazoline 0.05% Nasal Spray 2 Spray(s) Both Nostrils every 12 hours  pantoprazole    Tablet 40 milliGRAM(s) Oral before breakfast  sodium chloride 0.9%. 1000 milliLiter(s) (100 mL/Hr) IV Continuous <Continuous>    MEDICATIONS  (PRN):  acetaminophen     Tablet .. 650 milliGRAM(s) Oral every 6 hours PRN Temp greater or equal to 38C (100.4F), Mild Pain (1 - 3)  guaiFENesin Oral Liquid (Sugar-Free) 200 milliGRAM(s) Oral every 6 hours PRN Cough  melatonin 3 milliGRAM(s) Oral at bedtime PRN Insomnia  ondansetron Injectable 4 milliGRAM(s) IV Push every 8 hours PRN Nausea and/or Vomiting      __________________________________________________  REVIEW OF SYSTEMS:    CONSTITUTIONAL: No fever,   EYES: no acute visual disturbances  NECK: No pain or stiffness  RESPIRATORY: No cough; No shortness of breath  CARDIOVASCULAR: No chest pain, no palpitations  GASTROINTESTINAL: No pain. No nausea or vomiting; No diarrhea   NEUROLOGICAL: No headache or numbness, no tremors  MUSCULOSKELETAL: No joint pain, no muscle pain  GENITOURINARY: no dysuria, no frequency, no hesitancy  PSYCHIATRY: no depression , no anxiety  ALL OTHER  ROS negative        Vital Signs Last 24 Hrs  T(C): 36.9 (08 Jan 2024 07:00), Max: 39.4 (07 Jan 2024 22:35)  T(F): 98.4 (08 Jan 2024 07:00), Max: 102.9 (07 Jan 2024 22:35)  HR: 81 (08 Jan 2024 07:00) (81 - 93)  BP: 126/59 (08 Jan 2024 07:00) (109/61 - 147/65)  BP(mean): --  RR: 17 (08 Jan 2024 07:00) (16 - 17)  SpO2: 94% (08 Jan 2024 07:00) (94% - 98%)    Parameters below as of 08 Jan 2024 04:54  Patient On (Oxygen Delivery Method): room air        ________________________________________________  PHYSICAL EXAM:  GENERAL: NAD  HEENT: Normocephalic;  conjunctivae and sclerae clear; moist mucous membranes;   NECK : supple  CHEST/LUNG: Clear to auscultation bilaterally with good air entry   HEART: S1 S2  regular; no murmurs, gallops or rubs  ABDOMEN: Soft, Nontender, Nondistended; Bowel sounds present  EXTREMITIES: no cyanosis; no edema; no calf tenderness  SKIN: warm and dry; no rash  NERVOUS SYSTEM:  Awake and alert; Oriented  to place, person and time ; no new deficits    _________________________________________________  LABS:                        9.7    5.14  )-----------( 143      ( 08 Jan 2024 05:05 )             30.1     01-08    135  |  103  |  12  ----------------------------<  112<H>  3.6   |  27  |  1.17    Ca    8.7      08 Jan 2024 05:05  Phos  2.5     01-08  Mg     1.8     01-08    TPro  7.7  /  Alb  3.0<L>  /  TBili  0.3  /  DBili  x   /  AST  23  /  ALT  19  /  AlkPhos  69  01-07    PT/INR - ( 07 Jan 2024 18:36 )   PT: 13.0 sec;   INR: 1.14 ratio         PTT - ( 07 Jan 2024 18:36 )  PTT:35.6 sec  Urinalysis Basic - ( 08 Jan 2024 05:05 )    Color: x / Appearance: x / SG: x / pH: x  Gluc: 112 mg/dL / Ketone: x  / Bili: x / Urobili: x   Blood: x / Protein: x / Nitrite: x   Leuk Esterase: x / RBC: x / WBC x   Sq Epi: x / Non Sq Epi: x / Bacteria: x      CAPILLARY BLOOD GLUCOSE      POCT Blood Glucose.: 143 mg/dL (08 Jan 2024 11:59)        RADIOLOGY & ADDITIONAL TESTS:    Imaging  Reviewed:  YES/NO    Consultant(s) Notes Reviewed:   YES/ No      Plan of care was discussed with patient and /or primary care giver; all questions and concerns were addressed  NP Note discussed with  Primary Attending    Patient is a 78y old  Female who presents with a chief complaint of influenza PNA (07 Jan 2024 22:57)      INTERVAL HPI/OVERNIGHT EVENTS: no new complaints    MEDICATIONS  (STANDING):  aspirin  chewable 81 milliGRAM(s) Oral daily  atorvastatin 10 milliGRAM(s) Oral at bedtime  azithromycin  IVPB 500 milliGRAM(s) IV Intermittent every 24 hours  cefTRIAXone   IVPB 1000 milliGRAM(s) IV Intermittent every 24 hours  enoxaparin Injectable 40 milliGRAM(s) SubCutaneous every 24 hours  famotidine    Tablet 20 milliGRAM(s) Oral daily  insulin lispro (ADMELOG) corrective regimen sliding scale   SubCutaneous three times a day before meals  insulin lispro (ADMELOG) corrective regimen sliding scale   SubCutaneous at bedtime  oseltamivir 30 milliGRAM(s) Oral two times a day  oxymetazoline 0.05% Nasal Spray 2 Spray(s) Both Nostrils every 12 hours  pantoprazole    Tablet 40 milliGRAM(s) Oral before breakfast  sodium chloride 0.9%. 1000 milliLiter(s) (100 mL/Hr) IV Continuous <Continuous>    MEDICATIONS  (PRN):  acetaminophen     Tablet .. 650 milliGRAM(s) Oral every 6 hours PRN Temp greater or equal to 38C (100.4F), Mild Pain (1 - 3)  guaiFENesin Oral Liquid (Sugar-Free) 200 milliGRAM(s) Oral every 6 hours PRN Cough  melatonin 3 milliGRAM(s) Oral at bedtime PRN Insomnia  ondansetron Injectable 4 milliGRAM(s) IV Push every 8 hours PRN Nausea and/or Vomiting      __________________________________________________  REVIEW OF SYSTEMS:    CONSTITUTIONAL: No fever,   EYES: no acute visual disturbances  NECK: No pain or stiffness  RESPIRATORY: +cough; No shortness of breath  CARDIOVASCULAR: No chest pain, no palpitations  GASTROINTESTINAL: No pain. No nausea or vomiting; No diarrhea   NEUROLOGICAL: No headache or numbness, no tremors  MUSCULOSKELETAL: No joint pain, no muscle pain  GENITOURINARY: no dysuria, no frequency, no hesitancy  PSYCHIATRY: no depression , no anxiety  ALL OTHER  ROS negative        Vital Signs Last 24 Hrs  T(C): 36.9 (08 Jan 2024 07:00), Max: 39.4 (07 Jan 2024 22:35)  T(F): 98.4 (08 Jan 2024 07:00), Max: 102.9 (07 Jan 2024 22:35)  HR: 81 (08 Jan 2024 07:00) (81 - 93)  BP: 126/59 (08 Jan 2024 07:00) (109/61 - 147/65)  BP(mean): --  RR: 17 (08 Jan 2024 07:00) (16 - 17)  SpO2: 94% (08 Jan 2024 07:00) (94% - 98%)    Parameters below as of 08 Jan 2024 04:54  Patient On (Oxygen Delivery Method): room air        ________________________________________________  PHYSICAL EXAM:  GENERAL: NAD  HEENT: Normocephalic;  conjunctivae and sclerae clear; dry mucous membranes;   NECK : supple  CHEST/LUNG: Diminished   HEART: S1 S2  regular; no murmurs, gallops or rubs  ABDOMEN: Soft, Nontender, Nondistended; Bowel sounds present  EXTREMITIES: no cyanosis; no edema; no calf tenderness  SKIN: warm and dry; no rash  NERVOUS SYSTEM:  Awake and alert; Oriented to place, person and time ; no new deficits    _________________________________________________  LABS:                        9.7    5.14  )-----------( 143      ( 08 Jan 2024 05:05 )             30.1     01-08    135  |  103  |  12  ----------------------------<  112<H>  3.6   |  27  |  1.17    Ca    8.7      08 Jan 2024 05:05  Phos  2.5     01-08  Mg     1.8     01-08    TPro  7.7  /  Alb  3.0<L>  /  TBili  0.3  /  DBili  x   /  AST  23  /  ALT  19  /  AlkPhos  69  01-07    PT/INR - ( 07 Jan 2024 18:36 )   PT: 13.0 sec;   INR: 1.14 ratio         PTT - ( 07 Jan 2024 18:36 )  PTT:35.6 sec  Urinalysis Basic - ( 08 Jan 2024 05:05 )    Color: x / Appearance: x / SG: x / pH: x  Gluc: 112 mg/dL / Ketone: x  / Bili: x / Urobili: x   Blood: x / Protein: x / Nitrite: x   Leuk Esterase: x / RBC: x / WBC x   Sq Epi: x / Non Sq Epi: x / Bacteria: x      CAPILLARY BLOOD GLUCOSE      POCT Blood Glucose.: 143 mg/dL (08 Jan 2024 11:59)      RADIOLOGY & ADDITIONAL TESTS:  < from: CT Angio Neck w/ IV Cont (01.07.24 @ 20:11) >    ACC: [id number] EXAM:  CT ANGIO BRAIN (W)AW IC   ORDERED BY: SAMUEL QUINTANA     ACC: [id number] EXAM:  CT ANGIO NECK (W)AW IC   ORDERED BY: SAMUEL QUINTANA     ACC: [id number] EXAM:  CT BRAIN   ORDERED BY: SAMUEL QUINTANA     PROCEDURE DATE:  01/07/2024          INTERPRETATION:  Examinations were performed on this patient:  1. CT Angiography of the carotid arteries with IV contrast  2. CT Angiography of the intracranial circulation with IV contrast  3. CT Head without contrast      CLINICAL INFORMATION:  Carotid stenosis. Intracranial stenosis/aneurysm.   TIA/stroke.    TECHNIQUE:   Preceding intravenous contrast contiguous axial 4 mm   sections were obtained through the head. CT angiography images at .5 mm   thickness were acquired from the aortic arch to the vertex of the skull.     Images were acquired during rapid bolus intravenous administration of 90   mL of Omnipaque 350 contrast/ 10 mL discarded.  This data set was   reconstructed axial 1 mm sections. Post processing maximum intensity   projection angiographic reconstruction of images was performed.  This   data set was reconstructed and reviewed in 2 mm sagittal and coronal   reformatted images to evaluate carotid morphology and intracranial   vessels.   The magnitude of stenosis was determined using NASCET   criteria.   This scan was performed using automatic exposure control   (radiation dose reduction software) to obtain a diagnostic image quality   scan with patient dose as low as reasonably achievable.    FINDINGS:   No previous examinations are available for review.    The carotid circulation is intact without hemodynamically significant   stenosis.   The vertebral arteries are patent.    The intracranial circulation is intact without aneurysm, vascular   malformation or abnormal vessel termination.    The brain demonstrates no abnormal enhancement, attenuation or   mass-effect.  No acute cerebral cortical infarct is seen.  No   intracranial hemorrhage is found.  The ventricles, sulci and basal   cisterns appear unremarkable.    The orbits are unremarkable.  The paranasal sinuses are significant for   moderate mucosal thickening in the sphenoid, ethmoid maxillary and LEFT   frontal sinuses.  The nasal cavity remains intact.  The nasopharynx is   symmetric.  The central skull base, petrous temporal bones and calvarium   remain intact.        IMPRESSION:        1.   Right carotid system:  No hemodynamically significant stenosis.        2.   Left carotid system:  No hemodynamically significant stenosis.        3.   Intracranial circulation:  No significant vascular lesion.        4.   Brain:  No significant lesion identified.    --- End of Report ---      SARITA KELLER MD; Attending Radiologist  This document has been electronically signed. Jan 7 2024  8:30PM    < end of copied text >    Imaging  Reviewed:  YES    Consultant(s) Notes Reviewed:   YES      Plan of care was discussed with patient and /or primary care giver; all questions and concerns were addressed  NP Note discussed with  Primary Attending    Patient is a 78y old  Female who presents with a chief complaint of influenza PNA (07 Jan 2024 22:57)      INTERVAL HPI/OVERNIGHT EVENTS: no new complaints    MEDICATIONS  (STANDING):  aspirin  chewable 81 milliGRAM(s) Oral daily  atorvastatin 10 milliGRAM(s) Oral at bedtime  azithromycin  IVPB 500 milliGRAM(s) IV Intermittent every 24 hours  cefTRIAXone   IVPB 1000 milliGRAM(s) IV Intermittent every 24 hours  enoxaparin Injectable 40 milliGRAM(s) SubCutaneous every 24 hours  famotidine    Tablet 20 milliGRAM(s) Oral daily  insulin lispro (ADMELOG) corrective regimen sliding scale   SubCutaneous three times a day before meals  insulin lispro (ADMELOG) corrective regimen sliding scale   SubCutaneous at bedtime  oseltamivir 30 milliGRAM(s) Oral two times a day  oxymetazoline 0.05% Nasal Spray 2 Spray(s) Both Nostrils every 12 hours  pantoprazole    Tablet 40 milliGRAM(s) Oral before breakfast  sodium chloride 0.9%. 1000 milliLiter(s) (100 mL/Hr) IV Continuous <Continuous>    MEDICATIONS  (PRN):  acetaminophen     Tablet .. 650 milliGRAM(s) Oral every 6 hours PRN Temp greater or equal to 38C (100.4F), Mild Pain (1 - 3)  guaiFENesin Oral Liquid (Sugar-Free) 200 milliGRAM(s) Oral every 6 hours PRN Cough  melatonin 3 milliGRAM(s) Oral at bedtime PRN Insomnia  ondansetron Injectable 4 milliGRAM(s) IV Push every 8 hours PRN Nausea and/or Vomiting      __________________________________________________  REVIEW OF SYSTEMS:    CONSTITUTIONAL: No fever,   EYES: no acute visual disturbances  NECK: No pain or stiffness  RESPIRATORY: +cough; No shortness of breath  CARDIOVASCULAR: No chest pain, no palpitations  GASTROINTESTINAL: No pain. No nausea or vomiting; No diarrhea   NEUROLOGICAL: No headache or numbness, no tremors  MUSCULOSKELETAL: No joint pain, no muscle pain  GENITOURINARY: no dysuria, no frequency, no hesitancy  PSYCHIATRY: no depression , no anxiety  ALL OTHER  ROS negative        Vital Signs Last 24 Hrs  T(C): 36.9 (08 Jan 2024 07:00), Max: 39.4 (07 Jan 2024 22:35)  T(F): 98.4 (08 Jan 2024 07:00), Max: 102.9 (07 Jan 2024 22:35)  HR: 81 (08 Jan 2024 07:00) (81 - 93)  BP: 126/59 (08 Jan 2024 07:00) (109/61 - 147/65)  BP(mean): --  RR: 17 (08 Jan 2024 07:00) (16 - 17)  SpO2: 94% (08 Jan 2024 07:00) (94% - 98%)    Parameters below as of 08 Jan 2024 04:54  Patient On (Oxygen Delivery Method): room air        ________________________________________________  PHYSICAL EXAM:  GENERAL: NAD  HEENT: Normocephalic;  conjunctivae and sclerae clear; dry mucous membranes;   NECK : supple  CHEST/LUNG: Diminished   HEART: S1 S2  regular; no murmurs, gallops or rubs  ABDOMEN: Soft, Nontender, Nondistended; Bowel sounds present  EXTREMITIES: no cyanosis; no edema; no calf tenderness  SKIN: warm and dry; no rash  NERVOUS SYSTEM:  Awake and alert; Oriented to place, person and time ; no new deficits    _________________________________________________  LABS:                        9.7    5.14  )-----------( 143      ( 08 Jan 2024 05:05 )             30.1     01-08    135  |  103  |  12  ----------------------------<  112<H>  3.6   |  27  |  1.17    Ca    8.7      08 Jan 2024 05:05  Phos  2.5     01-08  Mg     1.8     01-08    TPro  7.7  /  Alb  3.0<L>  /  TBili  0.3  /  DBili  x   /  AST  23  /  ALT  19  /  AlkPhos  69  01-07    PT/INR - ( 07 Jan 2024 18:36 )   PT: 13.0 sec;   INR: 1.14 ratio         PTT - ( 07 Jan 2024 18:36 )  PTT:35.6 sec  Urinalysis Basic - ( 08 Jan 2024 05:05 )    Color: x / Appearance: x / SG: x / pH: x  Gluc: 112 mg/dL / Ketone: x  / Bili: x / Urobili: x   Blood: x / Protein: x / Nitrite: x   Leuk Esterase: x / RBC: x / WBC x   Sq Epi: x / Non Sq Epi: x / Bacteria: x      CAPILLARY BLOOD GLUCOSE      POCT Blood Glucose.: 143 mg/dL (08 Jan 2024 11:59)      RADIOLOGY & ADDITIONAL TESTS:  < from: CT Angio Neck w/ IV Cont (01.07.24 @ 20:11) >    ACC: [id number] EXAM:  CT ANGIO BRAIN (W)AW IC   ORDERED BY: SAMUEL QUINTANA     ACC: [id number] EXAM:  CT ANGIO NECK (W)AW IC   ORDERED BY: SAMUEL QUINTANA     ACC: [id number] EXAM:  CT BRAIN   ORDERED BY: SAMUEL QUINTANA     PROCEDURE DATE:  01/07/2024          INTERPRETATION:  Examinations were performed on this patient:  1. CT Angiography of the carotid arteries with IV contrast  2. CT Angiography of the intracranial circulation with IV contrast  3. CT Head without contrast      CLINICAL INFORMATION:  Carotid stenosis. Intracranial stenosis/aneurysm.   TIA/stroke.    TECHNIQUE:   Preceding intravenous contrast contiguous axial 4 mm   sections were obtained through the head. CT angiography images at .5 mm   thickness were acquired from the aortic arch to the vertex of the skull.     Images were acquired during rapid bolus intravenous administration of 90   mL of Omnipaque 350 contrast/ 10 mL discarded.  This data set was   reconstructed axial 1 mm sections. Post processing maximum intensity   projection angiographic reconstruction of images was performed.  This   data set was reconstructed and reviewed in 2 mm sagittal and coronal   reformatted images to evaluate carotid morphology and intracranial   vessels.   The magnitude of stenosis was determined using NASCET   criteria.   This scan was performed using automatic exposure control   (radiation dose reduction software) to obtain a diagnostic image quality   scan with patient dose as low as reasonably achievable.    FINDINGS:   No previous examinations are available for review.    The carotid circulation is intact without hemodynamically significant   stenosis.   The vertebral arteries are patent.    The intracranial circulation is intact without aneurysm, vascular   malformation or abnormal vessel termination.    The brain demonstrates no abnormal enhancement, attenuation or   mass-effect.  No acute cerebral cortical infarct is seen.  No   intracranial hemorrhage is found.  The ventricles, sulci and basal   cisterns appear unremarkable.    The orbits are unremarkable.  The paranasal sinuses are significant for   moderate mucosal thickening in the sphenoid, ethmoid maxillary and LEFT   frontal sinuses.  The nasal cavity remains intact.  The nasopharynx is   symmetric.  The central skull base, petrous temporal bones and calvarium   remain intact.        IMPRESSION:        1.   Right carotid system:  No hemodynamically significant stenosis.        2.   Left carotid system:  No hemodynamically significant stenosis.        3.   Intracranial circulation:  No significant vascular lesion.        4.   Brain:  No significant lesion identified.    --- End of Report ---      SARITA KELLER MD; Attending Radiologist  This document has been electronically signed. Jan 7 2024  8:30PM    < end of copied text >    Imaging  Reviewed:  YES    Consultant(s) Notes Reviewed:   YES      Plan of care was discussed with patient and /or primary care giver; all questions and concerns were addressed

## 2024-01-08 NOTE — DISCHARGE NOTE PROVIDER - PROVIDER TOKENS
PROVIDER:[TOKEN:[[id number]],FOLLOWUP:[1 week]] PROVIDER:[TOKEN:[[id number]],FOLLOWUP:[1 week]]

## 2024-01-08 NOTE — DISCHARGE NOTE PROVIDER - ATTENDING DISCHARGE PHYSICAL EXAMINATION:
GENERAL: sitting up in bed in no acute distress  HEAD:  Atraumatic, Normocephalic  EYES:  conjunctiva and sclera clear  NECK: Supple, No JVD  CHEST/LUNG: Breathing comfortably on room air; no accessory muscle use; crackles appreciated at the left mid lung zone, improving  HEART: Regular rate and rhythm; No murmurs, rubs, or gallops  ABDOMEN: Soft, Nontender, Nondistended; Bowel sounds present  EXTREMITIES:  2+ Peripheral Pulses, No clubbing, cyanosis, or edema  PSYCH: AAOx3  NEUROLOGY: non-focal; motor strength is 5/5 of bilateral LE  SKIN: No rashes or lesions AAOx3, pleasant, no acute distress, no focal neurological deficits, moving all extremities spontaneously, lungs are grossly clear

## 2024-01-09 ENCOUNTER — TRANSCRIPTION ENCOUNTER (OUTPATIENT)
Age: 79
End: 2024-01-09

## 2024-01-09 VITALS
SYSTOLIC BLOOD PRESSURE: 114 MMHG | DIASTOLIC BLOOD PRESSURE: 70 MMHG | OXYGEN SATURATION: 97 % | RESPIRATION RATE: 18 BRPM | TEMPERATURE: 98 F | HEART RATE: 80 BPM

## 2024-01-09 LAB
ANION GAP SERPL CALC-SCNC: 5 MMOL/L — SIGNIFICANT CHANGE UP (ref 5–17)
ANION GAP SERPL CALC-SCNC: 5 MMOL/L — SIGNIFICANT CHANGE UP (ref 5–17)
BUN SERPL-MCNC: 13 MG/DL — SIGNIFICANT CHANGE UP (ref 7–18)
BUN SERPL-MCNC: 13 MG/DL — SIGNIFICANT CHANGE UP (ref 7–18)
CALCIUM SERPL-MCNC: 8.9 MG/DL — SIGNIFICANT CHANGE UP (ref 8.4–10.5)
CALCIUM SERPL-MCNC: 8.9 MG/DL — SIGNIFICANT CHANGE UP (ref 8.4–10.5)
CHLORIDE SERPL-SCNC: 105 MMOL/L — SIGNIFICANT CHANGE UP (ref 96–108)
CHLORIDE SERPL-SCNC: 105 MMOL/L — SIGNIFICANT CHANGE UP (ref 96–108)
CHOLEST SERPL-MCNC: 146 MG/DL — SIGNIFICANT CHANGE UP
CHOLEST SERPL-MCNC: 146 MG/DL — SIGNIFICANT CHANGE UP
CO2 SERPL-SCNC: 27 MMOL/L — SIGNIFICANT CHANGE UP (ref 22–31)
CO2 SERPL-SCNC: 27 MMOL/L — SIGNIFICANT CHANGE UP (ref 22–31)
CREAT SERPL-MCNC: 1.21 MG/DL — SIGNIFICANT CHANGE UP (ref 0.5–1.3)
CREAT SERPL-MCNC: 1.21 MG/DL — SIGNIFICANT CHANGE UP (ref 0.5–1.3)
CULTURE RESULTS: SIGNIFICANT CHANGE UP
CULTURE RESULTS: SIGNIFICANT CHANGE UP
EGFR: 46 ML/MIN/1.73M2 — LOW
EGFR: 46 ML/MIN/1.73M2 — LOW
GLUCOSE BLDC GLUCOMTR-MCNC: 102 MG/DL — HIGH (ref 70–99)
GLUCOSE BLDC GLUCOMTR-MCNC: 102 MG/DL — HIGH (ref 70–99)
GLUCOSE BLDC GLUCOMTR-MCNC: 98 MG/DL — SIGNIFICANT CHANGE UP (ref 70–99)
GLUCOSE BLDC GLUCOMTR-MCNC: 98 MG/DL — SIGNIFICANT CHANGE UP (ref 70–99)
GLUCOSE SERPL-MCNC: 104 MG/DL — HIGH (ref 70–99)
GLUCOSE SERPL-MCNC: 104 MG/DL — HIGH (ref 70–99)
HCT VFR BLD CALC: 29.5 % — LOW (ref 34.5–45)
HCT VFR BLD CALC: 29.5 % — LOW (ref 34.5–45)
HDLC SERPL-MCNC: 26 MG/DL — LOW
HDLC SERPL-MCNC: 26 MG/DL — LOW
HGB BLD-MCNC: 9.6 G/DL — LOW (ref 11.5–15.5)
HGB BLD-MCNC: 9.6 G/DL — LOW (ref 11.5–15.5)
LEGIONELLA AG UR QL: NEGATIVE — SIGNIFICANT CHANGE UP
LEGIONELLA AG UR QL: NEGATIVE — SIGNIFICANT CHANGE UP
LIPID PNL WITH DIRECT LDL SERPL: 100 MG/DL — HIGH
LIPID PNL WITH DIRECT LDL SERPL: 100 MG/DL — HIGH
MCHC RBC-ENTMCNC: 30 PG — SIGNIFICANT CHANGE UP (ref 27–34)
MCHC RBC-ENTMCNC: 30 PG — SIGNIFICANT CHANGE UP (ref 27–34)
MCHC RBC-ENTMCNC: 32.5 GM/DL — SIGNIFICANT CHANGE UP (ref 32–36)
MCHC RBC-ENTMCNC: 32.5 GM/DL — SIGNIFICANT CHANGE UP (ref 32–36)
MCV RBC AUTO: 92.2 FL — SIGNIFICANT CHANGE UP (ref 80–100)
MCV RBC AUTO: 92.2 FL — SIGNIFICANT CHANGE UP (ref 80–100)
MRSA PCR RESULT.: SIGNIFICANT CHANGE UP
MRSA PCR RESULT.: SIGNIFICANT CHANGE UP
NON HDL CHOLESTEROL: 120 MG/DL — SIGNIFICANT CHANGE UP
NON HDL CHOLESTEROL: 120 MG/DL — SIGNIFICANT CHANGE UP
NRBC # BLD: 0 /100 WBCS — SIGNIFICANT CHANGE UP (ref 0–0)
NRBC # BLD: 0 /100 WBCS — SIGNIFICANT CHANGE UP (ref 0–0)
PLATELET # BLD AUTO: 139 K/UL — LOW (ref 150–400)
PLATELET # BLD AUTO: 139 K/UL — LOW (ref 150–400)
POTASSIUM SERPL-MCNC: 4.2 MMOL/L — SIGNIFICANT CHANGE UP (ref 3.5–5.3)
POTASSIUM SERPL-MCNC: 4.2 MMOL/L — SIGNIFICANT CHANGE UP (ref 3.5–5.3)
POTASSIUM SERPL-SCNC: 4.2 MMOL/L — SIGNIFICANT CHANGE UP (ref 3.5–5.3)
POTASSIUM SERPL-SCNC: 4.2 MMOL/L — SIGNIFICANT CHANGE UP (ref 3.5–5.3)
RBC # BLD: 3.2 M/UL — LOW (ref 3.8–5.2)
RBC # BLD: 3.2 M/UL — LOW (ref 3.8–5.2)
RBC # FLD: 14 % — SIGNIFICANT CHANGE UP (ref 10.3–14.5)
RBC # FLD: 14 % — SIGNIFICANT CHANGE UP (ref 10.3–14.5)
S AUREUS DNA NOSE QL NAA+PROBE: SIGNIFICANT CHANGE UP
S AUREUS DNA NOSE QL NAA+PROBE: SIGNIFICANT CHANGE UP
SODIUM SERPL-SCNC: 137 MMOL/L — SIGNIFICANT CHANGE UP (ref 135–145)
SODIUM SERPL-SCNC: 137 MMOL/L — SIGNIFICANT CHANGE UP (ref 135–145)
SPECIMEN SOURCE: SIGNIFICANT CHANGE UP
SPECIMEN SOURCE: SIGNIFICANT CHANGE UP
TRIGL SERPL-MCNC: 102 MG/DL — SIGNIFICANT CHANGE UP
TRIGL SERPL-MCNC: 102 MG/DL — SIGNIFICANT CHANGE UP
WBC # BLD: 4.83 K/UL — SIGNIFICANT CHANGE UP (ref 3.8–10.5)
WBC # BLD: 4.83 K/UL — SIGNIFICANT CHANGE UP (ref 3.8–10.5)
WBC # FLD AUTO: 4.83 K/UL — SIGNIFICANT CHANGE UP (ref 3.8–10.5)
WBC # FLD AUTO: 4.83 K/UL — SIGNIFICANT CHANGE UP (ref 3.8–10.5)

## 2024-01-09 PROCEDURE — 36415 COLL VENOUS BLD VENIPUNCTURE: CPT

## 2024-01-09 PROCEDURE — 87641 MR-STAPH DNA AMP PROBE: CPT

## 2024-01-09 PROCEDURE — 80053 COMPREHEN METABOLIC PANEL: CPT

## 2024-01-09 PROCEDURE — 99239 HOSP IP/OBS DSCHRG MGMT >30: CPT

## 2024-01-09 PROCEDURE — 0225U NFCT DS DNA&RNA 21 SARSCOV2: CPT

## 2024-01-09 PROCEDURE — 87449 NOS EACH ORGANISM AG IA: CPT

## 2024-01-09 PROCEDURE — 83036 HEMOGLOBIN GLYCOSYLATED A1C: CPT

## 2024-01-09 PROCEDURE — 93005 ELECTROCARDIOGRAM TRACING: CPT

## 2024-01-09 PROCEDURE — 81001 URINALYSIS AUTO W/SCOPE: CPT

## 2024-01-09 PROCEDURE — 83735 ASSAY OF MAGNESIUM: CPT

## 2024-01-09 PROCEDURE — 80061 LIPID PANEL: CPT

## 2024-01-09 PROCEDURE — 85730 THROMBOPLASTIN TIME PARTIAL: CPT

## 2024-01-09 PROCEDURE — 85027 COMPLETE CBC AUTOMATED: CPT

## 2024-01-09 PROCEDURE — 70498 CT ANGIOGRAPHY NECK: CPT | Mod: MA

## 2024-01-09 PROCEDURE — 99285 EMERGENCY DEPT VISIT HI MDM: CPT | Mod: 25

## 2024-01-09 PROCEDURE — 70496 CT ANGIOGRAPHY HEAD: CPT | Mod: MA

## 2024-01-09 PROCEDURE — 96375 TX/PRO/DX INJ NEW DRUG ADDON: CPT

## 2024-01-09 PROCEDURE — 87899 AGENT NOS ASSAY W/OPTIC: CPT

## 2024-01-09 PROCEDURE — 80048 BASIC METABOLIC PNL TOTAL CA: CPT

## 2024-01-09 PROCEDURE — 71045 X-RAY EXAM CHEST 1 VIEW: CPT

## 2024-01-09 PROCEDURE — 85610 PROTHROMBIN TIME: CPT

## 2024-01-09 PROCEDURE — 84484 ASSAY OF TROPONIN QUANT: CPT

## 2024-01-09 PROCEDURE — 85025 COMPLETE CBC W/AUTO DIFF WBC: CPT

## 2024-01-09 PROCEDURE — 86803 HEPATITIS C AB TEST: CPT

## 2024-01-09 PROCEDURE — 87640 STAPH A DNA AMP PROBE: CPT

## 2024-01-09 PROCEDURE — 70450 CT HEAD/BRAIN W/O DYE: CPT | Mod: MA

## 2024-01-09 PROCEDURE — 87086 URINE CULTURE/COLONY COUNT: CPT

## 2024-01-09 PROCEDURE — 71250 CT THORAX DX C-: CPT | Mod: MA

## 2024-01-09 PROCEDURE — 82962 GLUCOSE BLOOD TEST: CPT

## 2024-01-09 PROCEDURE — 84100 ASSAY OF PHOSPHORUS: CPT

## 2024-01-09 PROCEDURE — 96374 THER/PROPH/DIAG INJ IV PUSH: CPT

## 2024-01-09 PROCEDURE — 86738 MYCOPLASMA ANTIBODY: CPT

## 2024-01-09 RX ORDER — CEFUROXIME AXETIL 250 MG
1 TABLET ORAL
Qty: 8 | Refills: 0
Start: 2024-01-09 | End: 2024-01-12

## 2024-01-09 RX ORDER — AZITHROMYCIN 500 MG/1
1 TABLET, FILM COATED ORAL
Qty: 1 | Refills: 0
Start: 2024-01-09 | End: 2024-01-09

## 2024-01-09 RX ORDER — ATORVASTATIN CALCIUM 80 MG/1
1 TABLET, FILM COATED ORAL
Qty: 0 | Refills: 0 | DISCHARGE
Start: 2024-01-09

## 2024-01-09 RX ADMIN — FAMOTIDINE 20 MILLIGRAM(S): 10 INJECTION INTRAVENOUS at 12:44

## 2024-01-09 RX ADMIN — Medication 30 MILLIGRAM(S): at 05:12

## 2024-01-09 RX ADMIN — AZITHROMYCIN 255 MILLIGRAM(S): 500 TABLET, FILM COATED ORAL at 05:13

## 2024-01-09 RX ADMIN — Medication 200 MILLIGRAM(S): at 10:30

## 2024-01-09 RX ADMIN — Medication 81 MILLIGRAM(S): at 12:44

## 2024-01-09 RX ADMIN — PANTOPRAZOLE SODIUM 40 MILLIGRAM(S): 20 TABLET, DELAYED RELEASE ORAL at 05:12

## 2024-01-09 RX ADMIN — OXYMETAZOLINE HYDROCHLORIDE 2 SPRAY(S): 0.5 SPRAY NASAL at 05:13

## 2024-01-09 RX ADMIN — ENOXAPARIN SODIUM 40 MILLIGRAM(S): 100 INJECTION SUBCUTANEOUS at 05:12

## 2024-01-09 NOTE — DISCHARGE NOTE NURSING/CASE MANAGEMENT/SOCIAL WORK - NSDCPEFALRISK_GEN_ALL_CORE
For information on Fall & Injury Prevention, visit: [email or web address] OR  [email or web address] OR  https://www.cdc.gov/steadi/patient.html For information on Fall & Injury Prevention, visit: [email or web address] OR  [email or web address] OR  https://www.cdc.gov/steadi/patient.html

## 2024-01-09 NOTE — PHARMACOTHERAPY INTERVENTION NOTE - COMMENTS
Patient identified by STAR OLAF LIST for Heart Failure. Medication list was reviewed and no additional interventions at this time.

## 2024-01-09 NOTE — CHART NOTE - NSCHARTNOTEFT_GEN_A_CORE
Attempted to reach patient's primary physician, Dr. Rasta Santana. His office is closed today. I left a message giving a callback number. Attempted to make a followup appointment within one week. I discussed with patient the importance of following up outpatient provider within 2 weeks which patient endorses understanding

## 2024-01-09 NOTE — DISCHARGE NOTE NURSING/CASE MANAGEMENT/SOCIAL WORK - PATIENT PORTAL LINK FT
You can access the FollowMyHealth Patient Portal offered by [facility] by registering at the following website: [email or web address]. By joining [employer]’s FollowMyHealth portal, you will also be able to view your health information using other applications (apps) compatible with our system. You can access the FollowMyHealth Patient Portal offered by [facility] by registering at the following website: [email or web address]. By joining [employer]’s FollowMyHealth portal, you will also be able to view your health information using other applications (apps) compatible with our system.

## 2024-01-10 ENCOUNTER — TRANSCRIPTION ENCOUNTER (OUTPATIENT)
Age: 79
End: 2024-01-10

## 2024-01-10 LAB
M PNEUMO IGM SER-ACNC: 0.17 INDEX — SIGNIFICANT CHANGE UP (ref 0–0.9)
M PNEUMO IGM SER-ACNC: 0.17 INDEX — SIGNIFICANT CHANGE UP (ref 0–0.9)
MYCOPLASMA AG SPEC QL: NEGATIVE — SIGNIFICANT CHANGE UP
MYCOPLASMA AG SPEC QL: NEGATIVE — SIGNIFICANT CHANGE UP

## 2024-01-11 ENCOUNTER — APPOINTMENT (OUTPATIENT)
Age: 79
End: 2024-01-11
Payer: MEDICARE

## 2024-01-11 DIAGNOSIS — Z86.39 PERSONAL HISTORY OF OTHER ENDOCRINE, NUTRITIONAL AND METABOLIC DISEASE: ICD-10-CM

## 2024-01-11 DIAGNOSIS — R29.898 OTHER SYMPTOMS AND SIGNS INVOLVING THE MUSCULOSKELETAL SYSTEM: ICD-10-CM

## 2024-01-11 DIAGNOSIS — J11.00 INFLUENZA DUE TO UNIDENTIFIED INFLUENZA VIRUS WITH UNSPECIFIED TYPE OF PNEUMONIA: ICD-10-CM

## 2024-01-11 DIAGNOSIS — Z87.19 PERSONAL HISTORY OF OTHER DISEASES OF THE DIGESTIVE SYSTEM: ICD-10-CM

## 2024-01-11 PROCEDURE — 99348 HOME/RES VST EST LOW MDM 30: CPT

## 2024-01-12 ENCOUNTER — TRANSCRIPTION ENCOUNTER (OUTPATIENT)
Age: 79
End: 2024-01-12

## 2024-01-12 PROBLEM — Z87.19 HISTORY OF GASTROESOPHAGEAL REFLUX (GERD): Status: RESOLVED | Noted: 2024-01-12 | Resolved: 2024-01-12

## 2024-01-12 PROBLEM — R29.898 WEAKNESS OF BOTH LOWER EXTREMITIES: Status: ACTIVE | Noted: 2024-01-12

## 2024-01-12 PROBLEM — Z86.39 HISTORY OF TYPE 2 DIABETES MELLITUS: Status: RESOLVED | Noted: 2024-01-12 | Resolved: 2024-01-12

## 2024-01-12 PROBLEM — J11.00 INFLUENZA AND PNEUMONIA: Status: ACTIVE | Noted: 2024-01-12

## 2024-01-12 RX ORDER — BENZONATATE 100 MG/1
100 CAPSULE ORAL
Refills: 0 | Status: ACTIVE | COMMUNITY

## 2024-01-12 RX ORDER — FAMOTIDINE 20 MG/1
20 TABLET, FILM COATED ORAL
Refills: 0 | Status: ACTIVE | COMMUNITY

## 2024-01-12 RX ORDER — ATORVASTATIN CALCIUM 10 MG/1
10 TABLET, FILM COATED ORAL
Refills: 0 | Status: ACTIVE | COMMUNITY

## 2024-01-12 RX ORDER — PHENYLEPHRINE HCL 0.5 %
SPRAY, NON-AEROSOL (ML) NASAL
Refills: 0 | Status: ACTIVE | COMMUNITY

## 2024-01-12 RX ORDER — OMEPRAZOLE 20 MG/1
20 CAPSULE, DELAYED RELEASE ORAL
Refills: 0 | Status: ACTIVE | COMMUNITY

## 2024-01-12 NOTE — HISTORY OF PRESENT ILLNESS
[Home] : at home, [unfilled] , at the time of the visit. [Other Location: e.g. Home (Enter Location, City,State)___] : at [unfilled] [Verbal consent obtained from patient] : the patient, [unfilled] [FreeTextEntry1] : [facility] STAR HOSPITALIZATION FOLLOW UP [de-identified] : Mrs. Pandey is a 77 y/o woman with pmhx of T2DM, HLD and GERD who presented to [facility] with bilateral lower extremity weakness. CTH: negative. Found to have Influenza/PNA. Admitted and treated with Ceftriaxone, Azithromycin and Tamiflu. Discharged home without home care services.   Mrs. Pandey scheduled today for a telecommunication video, but she is unable to connect to the platform due to technological barriers. Currently, she reports that she is feeling better but remains with some fatigue. Denies shortness of breath at rest or with exertion, fever/chills, chest pain/tightness, N/V/D, constipation, and/or increased weakness. Medications reconciled and she reports adherence to medications.  Scheduled follow-up appointment with pulmonologist, Dr. Hdez on 1/12/24.

## 2024-01-12 NOTE — PHYSICAL EXAM
[No Acute Distress] : no acute distress [Normal Affect] : the affect was normal [Alert and Oriented x3] : oriented to person, place, and time [Normal Insight/Judgement] : insight and judgment were intact

## 2024-01-12 NOTE — HEALTH RISK ASSESSMENT
[No] : No [No falls in past year] : Patient reported no falls in the past year [Patient refused screening] : Patient refused screening [Patient unable to screen] : Patient unable to screen

## 2024-01-12 NOTE — ASSESSMENT
[FreeTextEntry1] : #Influenza and Pneumonia s/p tamiflu treatment and antibiotic treatment Supportive care: hydration, stagger activity with periods of rest, deep breathing exercises  f/u with pulmonologist on 1/12/24   #lower extremity weakness improved f/u with PCP for possible further workup maintain safety/fall precautions

## 2024-01-12 NOTE — PLAN
[FreeTextEntry1] : Patient/family was informed about NP's role/ STARS program and overview of transitional care reviewed with patient. Patient/family educated on topics of importance such as compliance with all provider visits, prescribed medication regimen, and low salt / heart healthy diet. Patient/Family encouraged calling NP with any issues, concerns or questions, also educated to notify NP if experiencing CP, SOB , cough, increased mucus/phlegm production, abdominal discomfort/swelling, difficulty sleeping or lying flat, fever, chills, fatigue, weight gain of 2-3lbs in 24 hours or 5lbs in one week, dizziness, lightheadedness, n/v/d/c, swelling to extremities and/or any c/o or concerns. Reassurance provided.

## 2024-01-13 ENCOUNTER — TRANSCRIPTION ENCOUNTER (OUTPATIENT)
Age: 79
End: 2024-01-13

## 2024-01-17 ENCOUNTER — TRANSCRIPTION ENCOUNTER (OUTPATIENT)
Age: 79
End: 2024-01-17

## 2024-01-26 RX ORDER — OXYMETAZOLINE HYDROCHLORIDE 0.5 MG/ML
2 SPRAY NASAL
Refills: 0 | DISCHARGE

## 2024-01-26 RX ORDER — ATORVASTATIN CALCIUM 80 MG/1
1 TABLET, FILM COATED ORAL
Refills: 0 | DISCHARGE

## 2024-01-26 RX ORDER — FAMOTIDINE 10 MG/ML
1 INJECTION INTRAVENOUS
Refills: 0 | DISCHARGE

## 2024-01-26 RX ORDER — OMEPRAZOLE 10 MG/1
1 CAPSULE, DELAYED RELEASE ORAL
Refills: 0 | DISCHARGE

## 2024-01-26 RX ORDER — METFORMIN HYDROCHLORIDE 850 MG/1
1 TABLET ORAL
Refills: 0 | DISCHARGE

## 2024-01-31 ENCOUNTER — TRANSCRIPTION ENCOUNTER (OUTPATIENT)
Age: 79
End: 2024-01-31

## 2025-04-07 NOTE — PATIENT PROFILE ADULT - WHEN WAS YOUR LAST VACCINATION? MONTH
LMOM for patient to call the office.    Patient is scheduled with Eula Oglesby 4/14/25.  Patient has not been set up with CPAP yet.    Contacted [facility] - they will expedite the order.    LMOM for patient to call once he receives his machine we will set him up for a 2 month fu with Eula.    Office number given on VM.  
November